# Patient Record
Sex: MALE
[De-identification: names, ages, dates, MRNs, and addresses within clinical notes are randomized per-mention and may not be internally consistent; named-entity substitution may affect disease eponyms.]

---

## 2018-06-07 ENCOUNTER — HOSPITAL ENCOUNTER (INPATIENT)
Dept: HOSPITAL 82 - ED | Age: 33
LOS: 2 days | Discharge: HOME | DRG: 343 | End: 2018-06-09
Attending: INTERNAL MEDICINE | Admitting: INTERNAL MEDICINE
Payer: SELF-PAY

## 2018-06-07 VITALS — BODY MASS INDEX: 26.89 KG/M2 | WEIGHT: 146.12 LBS | HEIGHT: 62 IN

## 2018-06-07 DIAGNOSIS — K35.80: Primary | ICD-10-CM

## 2018-06-07 LAB
ALBUMIN SERPL-MCNC: 4.1 G/DL (ref 3.2–5)
ALP SERPL-CCNC: 107 U/L (ref 38–126)
ALT SERPL-CCNC: 39 U/L (ref 21–72)
ANION GAP SERPL CALCULATED.3IONS-SCNC: 18 MMOL/L
AST SERPL-CCNC: 16 U/L (ref 17–59)
BASOPHILS NFR BLD AUTO: 0 % (ref 0–3)
BILIRUB UR QL STRIP.AUTO: NEGATIVE
BUN SERPL-MCNC: 14 MG/DL (ref 9–20)
BUN/CREAT SERPL: 18
CHLORIDE SERPL-SCNC: 103 MMOL/L (ref 95–108)
CLARITY UR: CLEAR
CO2 SERPL-SCNC: 27 MMOL/L (ref 22–30)
COLOR UR AUTO: YELLOW
CREAT SERPL-MCNC: 0.8 MG/DL (ref 0.7–1.3)
EOSINOPHIL NFR BLD AUTO: 3 % (ref 0–8)
ERYTHROCYTE [DISTWIDTH] IN BLOOD BY AUTOMATED COUNT: 11.9 % (ref 11.5–15.5)
GLUCOSE UR STRIP.AUTO-MCNC: NEGATIVE MG/DL
HCT VFR BLD AUTO: 38.7 % (ref 39–50)
HGB BLD-MCNC: 13.2 G/DL (ref 14–18)
HGB UR QL STRIP.AUTO: NEGATIVE
IMM GRANULOCYTES NFR BLD: 0.2 % (ref 0–1)
KETONES UR STRIP.AUTO-MCNC: NEGATIVE MG/DL
LEUKOCYTE ESTERASE UR QL STRIP.AUTO: NEGATIVE
LYMPHOCYTES NFR BLD: 20 % (ref 15–41)
MCH RBC QN AUTO: 29.2 PG  CALC (ref 26–32)
MCHC RBC AUTO-ENTMCNC: 34.1 G/L CALC (ref 32–36)
MCV RBC AUTO: 85.6 FL  CALC (ref 80–100)
MONOCYTES NFR BLD AUTO: 7 % (ref 2–13)
NEUTROPHILS # BLD AUTO: 8.86 THOU/UL (ref 1.82–7.42)
NEUTROPHILS NFR BLD AUTO: 69 % (ref 42–76)
NITRITE UR QL STRIP.AUTO: NEGATIVE
PH UR STRIP.AUTO: 7 [PH] (ref 4.5–8)
PLATELET # BLD AUTO: 238 THOU/UL (ref 130–400)
POTASSIUM SERPL-SCNC: 4.3 MMOL/L (ref 3.5–5.1)
PROT SERPL-MCNC: 8 G/DL (ref 6.3–8.2)
PROT UR QL STRIP.AUTO: NEGATIVE MG/DL
RBC # BLD AUTO: 4.52 MILL/UL (ref 4.7–6.1)
SODIUM SERPL-SCNC: 144 MMOL/L (ref 137–146)
SP GR UR STRIP.AUTO: 1.02
UROBILINOGEN UR QL STRIP.AUTO: 0.2 E.U./DL

## 2018-06-07 NOTE — NUR
DURING PALPATION DR. COBB, PT VERY TENDER IN RLQ ,  STATES HAS BEEN HAVING
PROBLEM WITH BM, HAS TAKEN DUCLOX WITH LITTLE RESULTS.

## 2018-06-08 VITALS — SYSTOLIC BLOOD PRESSURE: 127 MMHG | DIASTOLIC BLOOD PRESSURE: 60 MMHG

## 2018-06-08 VITALS — DIASTOLIC BLOOD PRESSURE: 61 MMHG | SYSTOLIC BLOOD PRESSURE: 93 MMHG

## 2018-06-08 VITALS — SYSTOLIC BLOOD PRESSURE: 123 MMHG | DIASTOLIC BLOOD PRESSURE: 56 MMHG

## 2018-06-08 VITALS — SYSTOLIC BLOOD PRESSURE: 110 MMHG | DIASTOLIC BLOOD PRESSURE: 58 MMHG

## 2018-06-08 VITALS — SYSTOLIC BLOOD PRESSURE: 110 MMHG | DIASTOLIC BLOOD PRESSURE: 51 MMHG

## 2018-06-08 VITALS — SYSTOLIC BLOOD PRESSURE: 109 MMHG | DIASTOLIC BLOOD PRESSURE: 68 MMHG

## 2018-06-08 VITALS — SYSTOLIC BLOOD PRESSURE: 119 MMHG | DIASTOLIC BLOOD PRESSURE: 70 MMHG

## 2018-06-08 VITALS — SYSTOLIC BLOOD PRESSURE: 127 MMHG | DIASTOLIC BLOOD PRESSURE: 65 MMHG

## 2018-06-08 VITALS — DIASTOLIC BLOOD PRESSURE: 60 MMHG | SYSTOLIC BLOOD PRESSURE: 127 MMHG

## 2018-06-08 VITALS — DIASTOLIC BLOOD PRESSURE: 68 MMHG | SYSTOLIC BLOOD PRESSURE: 115 MMHG

## 2018-06-08 VITALS — SYSTOLIC BLOOD PRESSURE: 120 MMHG | DIASTOLIC BLOOD PRESSURE: 70 MMHG

## 2018-06-08 LAB
BASOPHILS NFR BLD AUTO: 0 % (ref 0–3)
EOSINOPHIL NFR BLD AUTO: 3 % (ref 0–8)
ERYTHROCYTE [DISTWIDTH] IN BLOOD BY AUTOMATED COUNT: 11.9 % (ref 11.5–15.5)
HCT VFR BLD AUTO: 37.6 % (ref 39–50)
HGB BLD-MCNC: 12.8 G/DL (ref 14–18)
IMM GRANULOCYTES NFR BLD: 0.4 % (ref 0–1)
LYMPHOCYTES NFR BLD: 23 % (ref 15–41)
MCH RBC QN AUTO: 29 PG  CALC (ref 26–32)
MCHC RBC AUTO-ENTMCNC: 34 G/L CALC (ref 32–36)
MCV RBC AUTO: 85.1 FL  CALC (ref 80–100)
MONOCYTES NFR BLD AUTO: 7 % (ref 2–13)
NEUTROPHILS # BLD AUTO: 6.44 THOU/UL (ref 1.82–7.42)
NEUTROPHILS NFR BLD AUTO: 66 % (ref 42–76)
PLATELET # BLD AUTO: 212 THOU/UL (ref 130–400)
RBC # BLD AUTO: 4.42 MILL/UL (ref 4.7–6.1)

## 2018-06-08 PROCEDURE — 0DTJ4ZZ RESECTION OF APPENDIX, PERCUTANEOUS ENDOSCOPIC APPROACH: ICD-10-PCS | Performed by: SURGERY

## 2018-06-08 PROCEDURE — 0T9B70Z DRAINAGE OF BLADDER WITH DRAINAGE DEVICE, VIA NATURAL OR ARTIFICIAL OPENING: ICD-10-PCS | Performed by: INTERNAL MEDICINE

## 2018-06-08 NOTE — NUR
AT 1630 PT C/O LOWER ABD PAIN @ 10/10, UNABLE TO URINATE AFTER TRYING SEVERAL
TIMES, BLADDER SCANNED = 999ML, WARM COMPRESS APPLIED TO THIGH, NO RESULT,
DALILA TURNED ON FOR MANY MINUTES AND VERY LITTLE URINE PRODUCED. ARNP
ORDERED STRAIGHT CATH, 900 ML OUT IMMEDIATELY, PT REPORTED MUCH RELIEF WITH
PAIN @ 3/10, SLEEPING AT THIS TIME (1728), WILL CONTINUE TO MONITOR, CALL BELL
IN REACH.

## 2018-06-08 NOTE — NUR
ALICIA FROM OR CALLED AND REPORTED DR BECKER HAS BEEN IN CONTACT WITH OR, PLAN FOR
SURGICAL PROCEDURE APPROXIMATELY MIDDAY.

## 2018-06-08 NOTE — NUR
PT RESTING IN BED WITH EYES CLOSED. SPOUSE IN ROOM. RESP ARE EVEN AND
UNLABORED. NO DISTRESS NOTED. REMAINS AFEBRILE. REMAINS PAIN FREE. CALL LIGHT
IN REACH. WILL CONTINUE TO EFREN

## 2018-06-08 NOTE — NUR
RETURNED FROM PROCEDURE @ 1434, LETHARGIC BUT ORIENTED, GAIT UNSTEADY WHEN
AMBULATED FROM STRETCHER TO BED TO BR. REPORT RECEIVED FROM MICHAEL IN RECOVERY. PT
C/O ABD PAIN AT 10/10 THEN NAUSEA, STATES DIFFICULTY IN URINATING AT THIS
TIME, SETTLED IN BED, VITAL SIGNS MEASURED AND RECORDED, WILL CONTINUE TO
MONITOR AND ADDRESS NEEDS.

## 2018-06-08 NOTE — NUR
SHIFT CHANGE REPORT FROM MARQUIS AC AWAKE ALERT AND ORIENTED LYING SUPINE IN
BED, REPORT LAQ PAIN @ 2/10 AT THIS TIME, IVF INFUISNG, CALL BELL IN REACH,
SPOUSE AT BEDSIDE.

## 2018-06-08 NOTE — NUR
ZUHAIR (VERA) INFORMED OF NEED FOR H&P. DUDLEY FROM [facility] HERE AT THIS TIME,
RECEIVED PT AND IS TRANSFERRING HIM OFF UNIT NOW FOR PROCEDURE.

## 2018-06-08 NOTE — NUR
PT.MEDICATED FOR PAIN 7/10. DRESSING IS CDI. PT PROVIDED WARMED APPLE JUICE
AND CHICKEN BROTH, HE REPORTS THAT DRINKING ANYTHING COLD "HURTS." FAMILY
MEMBER IS AT BEDSIDE W/ROLL-AWAY BED. PT.ASSESSED, LUNG SOUNDS ARE CLEAR,
HYPO-ACTIVE BOWEL SOUNDS, DENIES N/V. NO S/O EDEMA. STRONG PULSES, LOCX4.
PT.ENCOURAGED TO CALL IF ANY NEEDS ARISE. CALL LIGHT AT BEDSIDE.

## 2018-06-08 NOTE — NUR
BEDSIDE REPORT HAS BEEN RECEIVED. PT.IS IN BED LOW FOWLERS POSITION W/FAMILY
MEMBER AT BEDSIDE. PT.REPORTS THAT HIS PAIN LEVEL IS 2/10 AT THIS TIME.
INCISIONAL DRESSINGS X4 ARE CDI. NO S/S OF DISTRESS, DENIES ANY OTHER NEEDS AT
THIS TIME. PT.ENCOURAGED TO CALL IF ANY NEEDS ARISE. VILLATORO CATHETER IS
DRAINING CLEAR YELLOW URINE. CALL LIGHT AT BEDSIDE.

## 2018-06-08 NOTE — NUR
PT TO ROOM 289 VIA WC ACCOMPANIED BY ER STAFF. PT AMBULATED TO SCALE AND BED.
PT IS ALERT AND ORIENTED X3. PERRLA. ADMISSION ASSESSMENT COMPLETED AT THIS
TIME. IV PATENT. ORIENTED TO CALL LIGHT AND CALL LIGHT SYSTEM. PT VERBALIZED
UNDERSTANDING. PLAN OF CARE DISCUSSED WITH PT. PT VERBALIZED UNDERSTANDING.
CALL LIGHT IN REACH. WILL CONTINUE TO MONITOR

## 2018-06-08 NOTE — NUR
Discharge instructions given. Patient verbalizes understanding of same.
Discharged in good condition via Wheelchair to Home with
spouse. All belongings sent with pt.

## 2018-06-08 NOTE — NUR
PT.IS IN BED IN LOW FOWLERS POSITION APPEARS TO BE SLEEPING W/FAMILY MEMBER AT
BEDSIDE. PT.AWOKE TO OUR ENTERING ROOM. VILLATORO CATHETER DRAINING CLEAR YELLOW
URINE 950CC'S EMPTIED. PT.MEDICATED W/ANTIBIOTIC THERAPY AT THIS TIME.
PT.REPORTS THAT HIS PAIN LEVEL IS DOWN TO 4/10 IN RESPONSE TO PERCOCET. WILL
CONTINUE TO MONITOR FOR PAIN. INCISION DRESSING CDIX4. PT.WAS UN ABLE TO DRINK
WARMED BROTH AND JUICE/DENIES NAUSEA.  PT.ENCOURAGED TO CALL IF ANY OTHER
NEEDS ARISE. CALL LIGHT AT BEDSIDE.

## 2018-06-09 VITALS — SYSTOLIC BLOOD PRESSURE: 117 MMHG | DIASTOLIC BLOOD PRESSURE: 67 MMHG

## 2018-06-09 VITALS — DIASTOLIC BLOOD PRESSURE: 65 MMHG | SYSTOLIC BLOOD PRESSURE: 112 MMHG

## 2018-06-09 VITALS — DIASTOLIC BLOOD PRESSURE: 85 MMHG | SYSTOLIC BLOOD PRESSURE: 141 MMHG

## 2018-06-09 LAB
ANION GAP SERPL CALCULATED.3IONS-SCNC: 9 MMOL/L
BUN SERPL-MCNC: 7 MG/DL (ref 9–20)
BUN/CREAT SERPL: 10
CHLORIDE SERPL-SCNC: 105 MMOL/L (ref 95–108)
CO2 SERPL-SCNC: 28 MMOL/L (ref 22–30)
CREAT SERPL-MCNC: 0.7 MG/DL (ref 0.7–1.3)
ERYTHROCYTE [DISTWIDTH] IN BLOOD BY AUTOMATED COUNT: 12.1 % (ref 11.5–15.5)
HCT VFR BLD AUTO: 36.4 % (ref 39–50)
HGB BLD-MCNC: 12.3 G/DL (ref 14–18)
MCH RBC QN AUTO: 29.1 PG  CALC (ref 26–32)
MCHC RBC AUTO-ENTMCNC: 33.8 G/L CALC (ref 32–36)
MCV RBC AUTO: 86.1 FL  CALC (ref 80–100)
PLATELET # BLD AUTO: 216 THOU/UL (ref 130–400)
POTASSIUM SERPL-SCNC: 3.8 MMOL/L (ref 3.5–5.1)
RBC # BLD AUTO: 4.23 MILL/UL (ref 4.7–6.1)
SODIUM SERPL-SCNC: 138 MMOL/L (ref 137–146)

## 2018-06-09 NOTE — NUR
AMBULATED DOWN HALLWAYS AND BACK TO ROOM
WITH SUPERVISION, PAIN CONCERNS ADDRESSED, CALL BELL IN REACH, FAMILY AT
BEDSIDE.

## 2018-06-09 NOTE — NUR
PT.MEDICATED FOR PAIN 4/10 AND IV ANTIBIOTIC THERAPY. ASSISTED W/PO FLUIDS.
FAMILY MEMBER ASLEEP AT BEDSIDE. PT.DENIES ANY OTHER NEEDS AT THIS TIME. CALL
LIGHT AT BEDSIDE AND PT.ENCOURAGED TO CALL IF ANY NEEDS ARISE.

## 2018-06-09 NOTE — NUR
VILLATORO CATHETER OUT @ 0800, NOT URINATED YET, BLADDER SCANNED = 253 ML. ORAL
FLUIDS OFFERED, WILL CONTINUE TO MONITOR.

## 2018-06-09 NOTE — NUR
PT.MEDICATED FOR PAIN REPORTED 6/10 PT.DENIES ANY OTHER NEEDS AT THIS TIME.
FAMILY MEMBER AT BEDSIDE. NO S/S OF DISTRESS NOTED AT THIS TIME. CALL LIGHT AT
SIDE

## 2018-06-09 NOTE — NUR
VILLATORO CATHETER REMOVED AND PT TOLERATED PROCEDURE WELL. @ 0930 PT WAS
AMBULATED DOWN HALLWAY WITH SUPERVISION, ADVISED TO USE URINAL FOR FIRST VOID.

## 2018-06-09 NOTE — NUR
SHIFT CHANGE REPORT FROM MARQUIS ANDERSON AWAKE ALERT AND ORIENTED RESTING IN BED,
IVF INFUSING, SURGICAL DRESSINGS X 4 TO ABDOMEN CDI, CALL BELL IN REACH.